# Patient Record
Sex: MALE | Race: WHITE | Employment: OTHER | URBAN - METROPOLITAN AREA
[De-identification: names, ages, dates, MRNs, and addresses within clinical notes are randomized per-mention and may not be internally consistent; named-entity substitution may affect disease eponyms.]

---

## 2017-10-18 PROCEDURE — 84154 ASSAY OF PSA FREE: CPT | Performed by: UROLOGY

## 2017-10-18 PROCEDURE — 36415 COLL VENOUS BLD VENIPUNCTURE: CPT | Performed by: UROLOGY

## 2017-10-18 PROCEDURE — 84153 ASSAY OF PSA TOTAL: CPT | Performed by: UROLOGY

## 2018-04-03 PROCEDURE — 88305 TISSUE EXAM BY PATHOLOGIST: CPT | Performed by: INTERNAL MEDICINE

## 2018-10-16 PROCEDURE — 84154 ASSAY OF PSA FREE: CPT | Performed by: UROLOGY

## 2018-10-16 PROCEDURE — 84153 ASSAY OF PSA TOTAL: CPT | Performed by: UROLOGY

## 2018-10-16 PROCEDURE — 36415 COLL VENOUS BLD VENIPUNCTURE: CPT | Performed by: UROLOGY

## 2021-03-31 RX ORDER — SODIUM CHLORIDE, SODIUM LACTATE, POTASSIUM CHLORIDE, CALCIUM CHLORIDE 600; 310; 30; 20 MG/100ML; MG/100ML; MG/100ML; MG/100ML
INJECTION, SOLUTION INTRAVENOUS CONTINUOUS
Status: CANCELLED | OUTPATIENT
Start: 2021-03-31

## 2021-03-31 RX ORDER — ACETAMINOPHEN 500 MG
1000 TABLET ORAL ONCE
Status: CANCELLED | OUTPATIENT
Start: 2021-03-31 | End: 2021-03-31

## 2021-04-21 ENCOUNTER — LAB ENCOUNTER (OUTPATIENT)
Dept: LAB | Facility: HOSPITAL | Age: 76
End: 2021-04-21
Attending: UROLOGY
Payer: MEDICARE

## 2021-04-21 DIAGNOSIS — N40.0 ENLARGED PROSTATE: ICD-10-CM

## 2021-04-23 ENCOUNTER — ANESTHESIA (OUTPATIENT)
Dept: SURGERY | Facility: HOSPITAL | Age: 76
End: 2021-04-23
Payer: MEDICARE

## 2021-04-23 ENCOUNTER — ANESTHESIA EVENT (OUTPATIENT)
Dept: SURGERY | Facility: HOSPITAL | Age: 76
End: 2021-04-23
Payer: MEDICARE

## 2021-04-23 ENCOUNTER — HOSPITAL ENCOUNTER (OUTPATIENT)
Facility: HOSPITAL | Age: 76
Discharge: HOME | End: 2021-04-24
Attending: UROLOGY | Admitting: UROLOGY
Payer: MEDICARE

## 2021-04-23 DIAGNOSIS — N40.0 ENLARGED PROSTATE: Primary | ICD-10-CM

## 2021-04-23 DIAGNOSIS — R33.9 INCOMPLETE EMPTYING OF BLADDER: ICD-10-CM

## 2021-04-23 PROCEDURE — 88305 TISSUE EXAM BY PATHOLOGIST: CPT | Performed by: UROLOGY

## 2021-04-23 PROCEDURE — 0V508ZZ DESTRUCTION OF PROSTATE, VIA NATURAL OR ARTIFICIAL OPENING ENDOSCOPIC: ICD-10-PCS | Performed by: UROLOGY

## 2021-04-23 RX ORDER — HYDROCODONE BITARTRATE AND ACETAMINOPHEN 5; 325 MG/1; MG/1
1 TABLET ORAL EVERY 6 HOURS PRN
Status: DISCONTINUED | OUTPATIENT
Start: 2021-04-23 | End: 2021-04-24

## 2021-04-23 RX ORDER — ACETAMINOPHEN 500 MG
1000 TABLET ORAL EVERY 6 HOURS PRN
Status: DISCONTINUED | OUTPATIENT
Start: 2021-04-23 | End: 2021-04-24

## 2021-04-23 RX ORDER — PANTOPRAZOLE SODIUM 20 MG/1
20 TABLET, DELAYED RELEASE ORAL
Status: DISCONTINUED | OUTPATIENT
Start: 2021-04-24 | End: 2021-04-24

## 2021-04-23 RX ORDER — MIDAZOLAM HYDROCHLORIDE 1 MG/ML
1 INJECTION INTRAMUSCULAR; INTRAVENOUS EVERY 5 MIN PRN
Status: DISCONTINUED | OUTPATIENT
Start: 2021-04-23 | End: 2021-04-23 | Stop reason: HOSPADM

## 2021-04-23 RX ORDER — METOCLOPRAMIDE HYDROCHLORIDE 5 MG/ML
10 INJECTION INTRAMUSCULAR; INTRAVENOUS AS NEEDED
Status: DISCONTINUED | OUTPATIENT
Start: 2021-04-23 | End: 2021-04-23 | Stop reason: HOSPADM

## 2021-04-23 RX ORDER — MAGNESIUM HYDROXIDE 1200 MG/15ML
3000 LIQUID ORAL CONTINUOUS
Status: DISCONTINUED | OUTPATIENT
Start: 2021-04-23 | End: 2021-04-23 | Stop reason: HOSPADM

## 2021-04-23 RX ORDER — ASCORBIC ACID 500 MG
500 TABLET ORAL 2 TIMES DAILY
Status: DISCONTINUED | OUTPATIENT
Start: 2021-04-23 | End: 2021-04-24

## 2021-04-23 RX ORDER — ACETAMINOPHEN 500 MG
1000 TABLET ORAL EVERY 6 HOURS PRN
COMMUNITY
End: 2021-09-29

## 2021-04-23 RX ORDER — HYDROMORPHONE HYDROCHLORIDE 1 MG/ML
0.4 INJECTION, SOLUTION INTRAMUSCULAR; INTRAVENOUS; SUBCUTANEOUS EVERY 5 MIN PRN
Status: DISCONTINUED | OUTPATIENT
Start: 2021-04-23 | End: 2021-04-23 | Stop reason: HOSPADM

## 2021-04-23 RX ORDER — DEXAMETHASONE SODIUM PHOSPHATE 4 MG/ML
VIAL (ML) INJECTION AS NEEDED
Status: DISCONTINUED | OUTPATIENT
Start: 2021-04-23 | End: 2021-04-23 | Stop reason: SURG

## 2021-04-23 RX ORDER — NALOXONE HYDROCHLORIDE 0.4 MG/ML
80 INJECTION, SOLUTION INTRAMUSCULAR; INTRAVENOUS; SUBCUTANEOUS AS NEEDED
Status: DISCONTINUED | OUTPATIENT
Start: 2021-04-23 | End: 2021-04-23 | Stop reason: HOSPADM

## 2021-04-23 RX ORDER — CEFAZOLIN SODIUM/WATER 2 G/20 ML
SYRINGE (ML) INTRAVENOUS
Status: DISPENSED
Start: 2021-04-23 | End: 2021-04-23

## 2021-04-23 RX ORDER — MIDAZOLAM HYDROCHLORIDE 1 MG/ML
INJECTION INTRAMUSCULAR; INTRAVENOUS AS NEEDED
Status: DISCONTINUED | OUTPATIENT
Start: 2021-04-23 | End: 2021-04-23 | Stop reason: SURG

## 2021-04-23 RX ORDER — ONDANSETRON 2 MG/ML
4 INJECTION INTRAMUSCULAR; INTRAVENOUS AS NEEDED
Status: DISCONTINUED | OUTPATIENT
Start: 2021-04-23 | End: 2021-04-23 | Stop reason: HOSPADM

## 2021-04-23 RX ORDER — ACETAMINOPHEN 500 MG
1000 TABLET ORAL ONCE
Status: DISCONTINUED | OUTPATIENT
Start: 2021-04-23 | End: 2021-04-23

## 2021-04-23 RX ORDER — MULTIVITAMIN/IRON/FOLIC ACID 18MG-0.4MG
250 TABLET ORAL DAILY
Status: DISCONTINUED | OUTPATIENT
Start: 2021-04-23 | End: 2021-04-24

## 2021-04-23 RX ORDER — CEFAZOLIN SODIUM/WATER 2 G/20 ML
2 SYRINGE (ML) INTRAVENOUS ONCE
Status: COMPLETED | OUTPATIENT
Start: 2021-04-23 | End: 2021-04-23

## 2021-04-23 RX ORDER — SODIUM CHLORIDE, SODIUM LACTATE, POTASSIUM CHLORIDE, CALCIUM CHLORIDE 600; 310; 30; 20 MG/100ML; MG/100ML; MG/100ML; MG/100ML
INJECTION, SOLUTION INTRAVENOUS CONTINUOUS
Status: DISCONTINUED | OUTPATIENT
Start: 2021-04-23 | End: 2021-04-23 | Stop reason: HOSPADM

## 2021-04-23 RX ORDER — SODIUM CHLORIDE, SODIUM LACTATE, POTASSIUM CHLORIDE, CALCIUM CHLORIDE 600; 310; 30; 20 MG/100ML; MG/100ML; MG/100ML; MG/100ML
INJECTION, SOLUTION INTRAVENOUS CONTINUOUS
Status: DISCONTINUED | OUTPATIENT
Start: 2021-04-23 | End: 2021-04-24

## 2021-04-23 RX ORDER — LABETALOL HYDROCHLORIDE 5 MG/ML
5 INJECTION, SOLUTION INTRAVENOUS EVERY 5 MIN PRN
Status: DISCONTINUED | OUTPATIENT
Start: 2021-04-23 | End: 2021-04-23 | Stop reason: HOSPADM

## 2021-04-23 RX ORDER — HYDROCODONE BITARTRATE AND ACETAMINOPHEN 5; 325 MG/1; MG/1
2 TABLET ORAL AS NEEDED
Status: DISCONTINUED | OUTPATIENT
Start: 2021-04-23 | End: 2021-04-23 | Stop reason: HOSPADM

## 2021-04-23 RX ORDER — TAMSULOSIN HYDROCHLORIDE 0.4 MG/1
0.4 CAPSULE ORAL DAILY
Status: DISCONTINUED | OUTPATIENT
Start: 2021-04-23 | End: 2021-04-24

## 2021-04-23 RX ORDER — HYDROCODONE BITARTRATE AND ACETAMINOPHEN 5; 325 MG/1; MG/1
1 TABLET ORAL AS NEEDED
Status: DISCONTINUED | OUTPATIENT
Start: 2021-04-23 | End: 2021-04-23 | Stop reason: HOSPADM

## 2021-04-23 RX ORDER — ONDANSETRON 2 MG/ML
INJECTION INTRAMUSCULAR; INTRAVENOUS AS NEEDED
Status: DISCONTINUED | OUTPATIENT
Start: 2021-04-23 | End: 2021-04-23 | Stop reason: SURG

## 2021-04-23 RX ORDER — LIDOCAINE HYDROCHLORIDE 10 MG/ML
INJECTION, SOLUTION EPIDURAL; INFILTRATION; INTRACAUDAL; PERINEURAL AS NEEDED
Status: DISCONTINUED | OUTPATIENT
Start: 2021-04-23 | End: 2021-04-23 | Stop reason: SURG

## 2021-04-23 RX ORDER — FINASTERIDE 5 MG/1
5 TABLET, FILM COATED ORAL DAILY
Status: DISCONTINUED | OUTPATIENT
Start: 2021-04-23 | End: 2021-04-24

## 2021-04-23 RX ADMIN — SODIUM CHLORIDE, SODIUM LACTATE, POTASSIUM CHLORIDE, CALCIUM CHLORIDE: 600; 310; 30; 20 INJECTION, SOLUTION INTRAVENOUS at 12:27:00

## 2021-04-23 RX ADMIN — CEFAZOLIN SODIUM/WATER 2 G: 2 G/20 ML SYRINGE (ML) INTRAVENOUS at 10:24:00

## 2021-04-23 RX ADMIN — ONDANSETRON 4 MG: 2 INJECTION INTRAMUSCULAR; INTRAVENOUS at 11:07:00

## 2021-04-23 RX ADMIN — MIDAZOLAM HYDROCHLORIDE 2 MG: 1 INJECTION INTRAMUSCULAR; INTRAVENOUS at 10:16:00

## 2021-04-23 RX ADMIN — SODIUM CHLORIDE, SODIUM LACTATE, POTASSIUM CHLORIDE, CALCIUM CHLORIDE: 600; 310; 30; 20 INJECTION, SOLUTION INTRAVENOUS at 10:16:00

## 2021-04-23 RX ADMIN — LIDOCAINE HYDROCHLORIDE 30 MG: 10 INJECTION, SOLUTION EPIDURAL; INFILTRATION; INTRACAUDAL; PERINEURAL at 10:21:00

## 2021-04-23 RX ADMIN — DEXAMETHASONE SODIUM PHOSPHATE 8 MG: 4 MG/ML VIAL (ML) INJECTION at 10:30:00

## 2021-04-23 NOTE — ANESTHESIA POSTPROCEDURE EVALUATION
[address] Patient Status:  Hospital Outpatient Surgery   Age/Gender 67year old male MRN [id number]   [facility] SURGERY Attending Antonieta Fleming MD   Hosp Day # 0 PCP Kavitha Wolf MD       Anesthesia Post-op Note

## 2021-04-23 NOTE — ANESTHESIA PREPROCEDURE EVALUATION
PRE-OP EVALUATION    Patient Name: Chapman Ormond McCoy    Admit Diagnosis: Enlarged prostate [N40.0]  Incomplete emptying of bladder [R33.9]    Pre-op Diagnosis: Enlarged prostate [N40.0]  Incomplete emptying of bladder [R33.9]    PHOTOSELECTIVE VAPORIZATIO Endo/Other                                  Pulmonary                           Neuro/Psych                              Patient Active Problem List:     Dysphagia     Neoplasm     Hematuria       Urinary tract infection  Esophageal stricture  Hiatal herni ROM: full Cardiovascular      Rhythm: regular  Rate: normal     Dental    No notable dental history. Pulmonary      Breath sounds clear to auscultation bilaterally.                Other findings            ASA: 2   Plan: general  NPO status verified

## 2021-04-23 NOTE — BRIEF OP NOTE
Pre-Operative Diagnosis: Enlarged prostate [N40.0]  Incomplete emptying of bladder [R33.9]     Post-Operative Diagnosis: Enlarged prostate [N40. 0]Incomplete emptying of bladder [R33.9] urethral stricture     Procedure Performed:   PHOTOSELECTIVE VAPORIZATI

## 2021-04-23 NOTE — ANESTHESIA PROCEDURE NOTES
Airway  Date/Time: 4/23/2021 10:22 AM  Urgency: elective    Airway not difficult    General Information and Staff    Patient location during procedure: OR  Anesthesiologist: Janiya Stallworth MD  Performed: anesthesiologist     Indications and Patient Condi

## 2021-04-23 NOTE — CONSULTS
[facility] Hospitalist Initial Consult       Reason for Consult: Medical Management sp TURP      History of Present Illness: Patient is a 67year old male with PMH sig for BPH who presents sp the above procedure.  He tolerated the procedure well without any immedi Pack years: 0        Quit date: 2005        Years since quittin.3      Smokeless tobacco: Never Used      Tobacco comment: PIPE, QUIT , no cigarrettes    Alcohol use: Yes      Comment: wine x1/every other day       Family Hx:  Family History Bruno Bautista MD  [facility] Hospitalist  Pager: [phone]

## 2021-04-23 NOTE — H&P
Jose Pillai MD  Family Medicine  Enlarged prostate +2 more  Dx  Pre-Op Exam   Reason for Visit          Reason for Visit    Pre-Op Exam Prostate, [facility], 4/23 Dr Alex Otero Notes  Jose Pillai MD (Physician) • • Family Medicine  Expand Socioeconomic History      Marital status:       Spouse name: Not on file      Number of children: Not on file      Years of education: Not on file      Highest education level: Not on file    Occupational History      Not on file    Tobacco Use (20 mg total) by mouth daily. 90 capsule 3   • finasteride 5 MG Oral Tab Take 1 tablet (5 mg total) by mouth daily.  90 tablet 3   • Magnesium 200 MG Oral Tab Take  by mouth daily.       • VITAMIN C 500 MG OR TABS 1 TABLET TWICE DAILY       • METAMUCIL OR 1 no G/R/R   LYMPHATIC: no lymphadenopathy  MUSCULOSKELETAL: no acute synovitis upper or lower extremity  EXTREMITIES: no cyanosis, clubbing or edema, peripheral pulses intact  NEUROLOGIC: intact; no sensorimotor deficit; reflexes normal  PSYCHIATRIC: alert

## 2021-04-24 VITALS
TEMPERATURE: 98 F | SYSTOLIC BLOOD PRESSURE: 152 MMHG | HEIGHT: 70 IN | OXYGEN SATURATION: 95 % | BODY MASS INDEX: 22.13 KG/M2 | HEART RATE: 64 BPM | RESPIRATION RATE: 18 BRPM | WEIGHT: 154.56 LBS | DIASTOLIC BLOOD PRESSURE: 74 MMHG

## 2021-04-24 PROBLEM — N13.8 BPH WITH OBSTRUCTION/LOWER URINARY TRACT SYMPTOMS: Status: ACTIVE | Noted: 2021-04-24

## 2021-04-24 PROBLEM — N40.1 BPH WITH OBSTRUCTION/LOWER URINARY TRACT SYMPTOMS: Status: ACTIVE | Noted: 2021-04-24

## 2021-04-24 NOTE — PLAN OF CARE
Problem: Patient/Family Goals  Goal: Patient/Family Long Term Goal  Description: Patient's Long Term Goal: GO HOME  Interventions:  - IV FLUIDS  -CBI  -VS Q 4 HRS  - See additional Care Plan goals for specific interventions  Outcome: Not Leo Sung INTERVENTIONS:  - Encourage pt to monitor pain and request assistance  - Assess pain using appropriate pain scale  - Administer analgesics based on type and severity of pain and evaluate response  - Implement non-pharmacological measures as appropriate and

## 2021-04-24 NOTE — PLAN OF CARE
Pt a&ox4, VSS, afebrile. Pt denies pain, denies nausea. CBI running at slow rate via 3-way sarabia, light pink urine draining. No clots noted. Safety precautions in place, plan of care discussed with patient, verbalizes understanding.       Problem: Patient/F Consider collaborating with pharmacy to review patient's medication profile  - Implement strategies to promote bladder emptying  Outcome: Progressing     Problem: PAIN - ADULT  Goal: Verbalizes/displays adequate comfort level or patient's stated pain goal

## 2021-04-24 NOTE — DISCHARGE SUMMARY
[facility]    Discharge Summary    Buck Gowers McCoy Patient Status:  Outpatient in a Bed    1945 MRN [id number]   [facility] 3NW-A Attending Michelle Vivas MD   Hosp Day # 0 PCP Jo Norman MD     Date of Admission:  none    Discharge Condition: Stable         Discharge Medications:      Discharge Medications      CONTINUE taking these medications      Instructions Prescription details   acetaminophen 500 MG Tabs  Commonly known as: TYLENOL EXTRA STRENGTH      Take 1,0

## 2021-04-24 NOTE — PLAN OF CARE
NURSING DISCHARGE NOTE    Discharged Home via Wheelchair. Accompanied by Spouse and Support staff  Belongings Taken by patient/family. PT DC VIA WHEELCHAIR IN STABLE CONDITION.  REMOVED IV. EDUCATED PT ON NEGRO CARE AND SIGNS AND SYMPTOMS OF AN INFE Monitor intake/output and perform bladder scan as needed  - Follow urinary retention protocol/standard of care  - Consider collaborating with pharmacy to review patient's medication profile  - Implement strategies to promote bladder emptying  Outcome: Prog

## 2021-04-24 NOTE — PROGRESS NOTES
[address] [facility] Note                                                                   [facility]  7/4/1945    SUBJECTIVE: No chest pain, palpitations, shortness of breath

## 2021-04-24 NOTE — OPERATIVE REPORT
[facility]    PATIENT'S NAME: Shasta Crabtree   ATTENDING PHYSICIAN: Rupali Santana M.D. OPERATING PHYSICIAN: Rupali Santana M.D.    PATIENT ACCOUNT#:   [de-identified]    LOCATION:  [address] [place] [address]  MEDICAL RECORD #:   [id number]       DATE OF BIRTH:  [birth year] Next, GreenLight XPS laser was used to vaporize the lateral lobe of the prostate. There was persistent bleeding. Attempts were made to try to control this without success.   As a result, I converted the procedure to the bipolar transurethral resection of 18:45:53  [facility] [id number]  /

## 2021-06-02 ENCOUNTER — APPOINTMENT (OUTPATIENT)
Dept: LAB | Age: 76
End: 2021-06-02

## 2021-06-02 DIAGNOSIS — N40.1 BPH WITH OBSTRUCTION/LOWER URINARY TRACT SYMPTOMS: Primary | ICD-10-CM

## 2021-06-02 DIAGNOSIS — N13.8 BPH WITH OBSTRUCTION/LOWER URINARY TRACT SYMPTOMS: Primary | ICD-10-CM

## 2021-06-02 LAB
APPEARANCE UR: ABNORMAL
BACTERIA #/AREA URNS HPF: ABNORMAL /HPF
BILIRUB UR QL STRIP: NEGATIVE
COLOR UR: YELLOW
GLUCOSE UR STRIP-MCNC: NEGATIVE MG/DL
HGB UR QL STRIP: ABNORMAL
HYALINE CASTS #/AREA URNS LPF: ABNORMAL /LPF
KETONES UR STRIP-MCNC: NEGATIVE MG/DL
LEUKOCYTE ESTERASE UR QL STRIP: ABNORMAL
MUCOUS THREADS URNS QL MICRO: PRESENT
NITRITE UR QL STRIP: NEGATIVE
PH UR STRIP: 6 [PH] (ref 5–7)
PROT UR STRIP-MCNC: 100 MG/DL
RBC #/AREA URNS HPF: ABNORMAL /HPF
SP GR UR STRIP: 1.01 (ref 1–1.03)
SQUAMOUS #/AREA URNS HPF: ABNORMAL /HPF
UROBILINOGEN UR STRIP-MCNC: 0.2 MG/DL
WBC #/AREA URNS HPF: >100 /HPF

## 2021-06-02 PROCEDURE — 81001 URINALYSIS AUTO W/SCOPE: CPT | Performed by: CLINICAL MEDICAL LABORATORY

## 2021-06-02 PROCEDURE — 87186 SC STD MICRODIL/AGAR DIL: CPT | Performed by: CLINICAL MEDICAL LABORATORY

## 2021-06-02 PROCEDURE — 87086 URINE CULTURE/COLONY COUNT: CPT | Performed by: CLINICAL MEDICAL LABORATORY

## 2021-06-02 PROCEDURE — 87077 CULTURE AEROBIC IDENTIFY: CPT | Performed by: CLINICAL MEDICAL LABORATORY

## 2021-06-05 LAB — BACTERIA UR CULT: ABNORMAL

## 2021-06-10 ENCOUNTER — HOSPITAL ENCOUNTER (EMERGENCY)
Facility: HOSPITAL | Age: 76
Discharge: LEFT BEFORE DISCHARGE | End: 2021-06-10
Payer: MEDICARE

## 2021-06-13 ENCOUNTER — HOSPITAL ENCOUNTER (OUTPATIENT)
Facility: HOSPITAL | Age: 76
Setting detail: OBSERVATION
Discharge: HOME | DRG: 713 | End: 2021-06-14
Attending: EMERGENCY MEDICINE | Admitting: PEDIATRICS
Payer: MEDICARE

## 2021-06-13 ENCOUNTER — ANESTHESIA EVENT (OUTPATIENT)
Dept: SURGERY | Facility: HOSPITAL | Age: 76
DRG: 713 | End: 2021-06-13
Payer: MEDICARE

## 2021-06-13 ENCOUNTER — ANESTHESIA (OUTPATIENT)
Dept: SURGERY | Facility: HOSPITAL | Age: 76
DRG: 713 | End: 2021-06-13
Payer: MEDICARE

## 2021-06-13 DIAGNOSIS — R33.9 URINE RETENTION: Primary | ICD-10-CM

## 2021-06-13 PROCEDURE — 99285 EMERGENCY DEPT VISIT HI MDM: CPT

## 2021-06-13 PROCEDURE — 81001 URINALYSIS AUTO W/SCOPE: CPT | Performed by: EMERGENCY MEDICINE

## 2021-06-13 PROCEDURE — 0V508ZZ DESTRUCTION OF PROSTATE, VIA NATURAL OR ARTIFICIAL OPENING ENDOSCOPIC: ICD-10-PCS | Performed by: UROLOGY

## 2021-06-13 PROCEDURE — 87086 URINE CULTURE/COLONY COUNT: CPT | Performed by: EMERGENCY MEDICINE

## 2021-06-13 PROCEDURE — 80048 BASIC METABOLIC PNL TOTAL CA: CPT | Performed by: EMERGENCY MEDICINE

## 2021-06-13 PROCEDURE — 36415 COLL VENOUS BLD VENIPUNCTURE: CPT

## 2021-06-13 PROCEDURE — 0T7D8ZZ DILATION OF URETHRA, VIA NATURAL OR ARTIFICIAL OPENING ENDOSCOPIC: ICD-10-PCS | Performed by: UROLOGY

## 2021-06-13 PROCEDURE — 93010 ELECTROCARDIOGRAM REPORT: CPT

## 2021-06-13 PROCEDURE — 93005 ELECTROCARDIOGRAM TRACING: CPT

## 2021-06-13 PROCEDURE — 85025 COMPLETE CBC W/AUTO DIFF WBC: CPT | Performed by: EMERGENCY MEDICINE

## 2021-06-13 PROCEDURE — 0TCB8ZZ EXTIRPATION OF MATTER FROM BLADDER, VIA NATURAL OR ARTIFICIAL OPENING ENDOSCOPIC: ICD-10-PCS | Performed by: UROLOGY

## 2021-06-13 RX ORDER — PHENAZOPYRIDINE HYDROCHLORIDE 200 MG/1
200 TABLET, FILM COATED ORAL 3 TIMES DAILY PRN
Status: DISCONTINUED | OUTPATIENT
Start: 2021-06-13 | End: 2021-06-14

## 2021-06-13 RX ORDER — NALOXONE HYDROCHLORIDE 0.4 MG/ML
80 INJECTION, SOLUTION INTRAMUSCULAR; INTRAVENOUS; SUBCUTANEOUS AS NEEDED
Status: DISCONTINUED | OUTPATIENT
Start: 2021-06-13 | End: 2021-06-13 | Stop reason: HOSPADM

## 2021-06-13 RX ORDER — PANTOPRAZOLE SODIUM 20 MG/1
20 TABLET, DELAYED RELEASE ORAL
Status: DISCONTINUED | OUTPATIENT
Start: 2021-06-14 | End: 2021-06-14

## 2021-06-13 RX ORDER — HYDROMORPHONE HYDROCHLORIDE 1 MG/ML
0.4 INJECTION, SOLUTION INTRAMUSCULAR; INTRAVENOUS; SUBCUTANEOUS EVERY 5 MIN PRN
Status: DISCONTINUED | OUTPATIENT
Start: 2021-06-13 | End: 2021-06-13 | Stop reason: HOSPADM

## 2021-06-13 RX ORDER — AMOXICILLIN 500 MG/1
500 CAPSULE ORAL 3 TIMES DAILY
Status: DISCONTINUED | OUTPATIENT
Start: 2021-06-13 | End: 2021-06-14

## 2021-06-13 RX ORDER — FINASTERIDE 5 MG/1
5 TABLET, FILM COATED ORAL DAILY
Status: DISCONTINUED | OUTPATIENT
Start: 2021-06-13 | End: 2021-06-14

## 2021-06-13 RX ORDER — SODIUM CHLORIDE 9 MG/ML
INJECTION, SOLUTION INTRAVENOUS CONTINUOUS
Status: DISCONTINUED | OUTPATIENT
Start: 2021-06-13 | End: 2021-06-14

## 2021-06-13 RX ORDER — SODIUM CHLORIDE 9 MG/ML
INJECTION, SOLUTION INTRAVENOUS CONTINUOUS
Status: DISCONTINUED | OUTPATIENT
Start: 2021-06-13 | End: 2021-06-13 | Stop reason: HOSPADM

## 2021-06-13 RX ORDER — IBUPROFEN 200 MG
CAPSULE ORAL 2 TIMES DAILY
Status: DISCONTINUED | OUTPATIENT
Start: 2021-06-13 | End: 2021-06-14

## 2021-06-13 RX ORDER — LIDOCAINE HYDROCHLORIDE 20 MG/ML
10 JELLY TOPICAL ONCE
Status: COMPLETED | OUTPATIENT
Start: 2021-06-13 | End: 2021-06-13

## 2021-06-13 RX ORDER — LIDOCAINE HYDROCHLORIDE 10 MG/ML
INJECTION, SOLUTION EPIDURAL; INFILTRATION; INTRACAUDAL; PERINEURAL AS NEEDED
Status: DISCONTINUED | OUTPATIENT
Start: 2021-06-13 | End: 2021-06-13 | Stop reason: SURG

## 2021-06-13 RX ORDER — ONDANSETRON 2 MG/ML
4 INJECTION INTRAMUSCULAR; INTRAVENOUS AS NEEDED
Status: DISCONTINUED | OUTPATIENT
Start: 2021-06-13 | End: 2021-06-13 | Stop reason: HOSPADM

## 2021-06-13 RX ORDER — DEXAMETHASONE SODIUM PHOSPHATE 4 MG/ML
VIAL (ML) INJECTION AS NEEDED
Status: DISCONTINUED | OUTPATIENT
Start: 2021-06-13 | End: 2021-06-13 | Stop reason: SURG

## 2021-06-13 RX ORDER — HYDROCODONE BITARTRATE AND ACETAMINOPHEN 5; 325 MG/1; MG/1
1 TABLET ORAL EVERY 6 HOURS PRN
Status: DISCONTINUED | OUTPATIENT
Start: 2021-06-13 | End: 2021-06-14

## 2021-06-13 RX ORDER — MULTIVITAMIN/IRON/FOLIC ACID 18MG-0.4MG
250 TABLET ORAL DAILY
Status: DISCONTINUED | OUTPATIENT
Start: 2021-06-14 | End: 2021-06-14

## 2021-06-13 RX ORDER — CEFAZOLIN SODIUM 1 G/3ML
INJECTION, POWDER, FOR SOLUTION INTRAMUSCULAR; INTRAVENOUS AS NEEDED
Status: DISCONTINUED | OUTPATIENT
Start: 2021-06-13 | End: 2021-06-13 | Stop reason: SURG

## 2021-06-13 RX ORDER — SODIUM CHLORIDE 9 MG/ML
INJECTION, SOLUTION INTRAVENOUS CONTINUOUS PRN
Status: DISCONTINUED | OUTPATIENT
Start: 2021-06-13 | End: 2021-06-13 | Stop reason: SURG

## 2021-06-13 RX ORDER — LIDOCAINE HYDROCHLORIDE 20 MG/ML
JELLY TOPICAL AS NEEDED
Status: DISCONTINUED | OUTPATIENT
Start: 2021-06-13 | End: 2021-06-13

## 2021-06-13 RX ORDER — SODIUM CHLORIDE, SODIUM LACTATE, POTASSIUM CHLORIDE, CALCIUM CHLORIDE 600; 310; 30; 20 MG/100ML; MG/100ML; MG/100ML; MG/100ML
INJECTION, SOLUTION INTRAVENOUS CONTINUOUS
Status: DISCONTINUED | OUTPATIENT
Start: 2021-06-13 | End: 2021-06-13

## 2021-06-13 RX ORDER — POLYETHYLENE GLYCOL 3350 17 G/17G
17 POWDER, FOR SOLUTION ORAL DAILY
COMMUNITY
End: 2021-09-29

## 2021-06-13 RX ORDER — ONDANSETRON 2 MG/ML
INJECTION INTRAMUSCULAR; INTRAVENOUS AS NEEDED
Status: DISCONTINUED | OUTPATIENT
Start: 2021-06-13 | End: 2021-06-13 | Stop reason: SURG

## 2021-06-13 RX ORDER — ACETAMINOPHEN 500 MG
500 TABLET ORAL EVERY 4 HOURS PRN
Status: DISCONTINUED | OUTPATIENT
Start: 2021-06-13 | End: 2021-06-14

## 2021-06-13 RX ADMIN — LIDOCAINE HYDROCHLORIDE 25 MG: 10 INJECTION, SOLUTION EPIDURAL; INFILTRATION; INTRACAUDAL; PERINEURAL at 13:11:00

## 2021-06-13 RX ADMIN — ONDANSETRON 4 MG: 2 INJECTION INTRAMUSCULAR; INTRAVENOUS at 13:32:00

## 2021-06-13 RX ADMIN — SODIUM CHLORIDE: 9 INJECTION, SOLUTION INTRAVENOUS at 13:05:00

## 2021-06-13 RX ADMIN — CEFAZOLIN SODIUM 2 G: 1 INJECTION, POWDER, FOR SOLUTION INTRAMUSCULAR; INTRAVENOUS at 13:17:00

## 2021-06-13 RX ADMIN — DEXAMETHASONE SODIUM PHOSPHATE 4 MG: 4 MG/ML VIAL (ML) INJECTION at 13:25:00

## 2021-06-13 NOTE — ANESTHESIA POSTPROCEDURE EVALUATION
[address] Patient Status:  Emergency   Age/Gender 67year old male MRN [id number]   [facility] SURGERY Attending Ana Gonzalez, [address] [facility] Day # 0 PCP Earl Durham MD       Anesthesia Post-op Note    CYSTOSCOPY

## 2021-06-13 NOTE — CONSULTS
[facility] Urology Consultation    Manjit Rosales Hernandez Patient Status:  Emergency    1945 MRN [id number]   Location [address] [facility] Attending [facility], [address] [facility] Day # 0 PCP Marques Thao MD     Reason for [facility] Cystoscopy, PVR, Prostate Biopsy - dr. Tello Ness    • OTHER SURGICAL HISTORY  04/23/2021    TURP w/ UD - Dr. Tello Ness   • SPECIAL SERVICE OR REPORT      DENIES     Family History   Problem Relation Age of Onset   • Hypertension Father    • Heart Disorder Father hematuria with urinary retention  -Suspect secondary to clots and recurrent urethral stricture disease  -s/p cysto, UD, PVP/TURP 4/23/21 with Dr Bessie Almonte with steadily decreasing stream and issues with UTI since (on amox since 6/7/21)  -Multiple attempts to p

## 2021-06-13 NOTE — ED PROVIDER NOTES
Patient Seen in: [facility] Emergency Department      History   Patient presents with:  Urinary Retention    Stated Complaint: hasnt urinated since 3am    HPI/Subjective:   HPI    25-year-old male presents with urinary retention.   States the last ti 04/23/2021    KISHORE carrera/ RUBIN - Dr. Kacey Palma   • [facility] REPORT      DENIES                Social History    Tobacco Use      Smoking status: Former Smoker        Packs/day: 0.00        Years: 30.00        Pack years: 0        Quit date: 1/1/2005 following orders were created for panel order CBC With Differential With Platelet.   Procedure                               Abnormality         Status                     ---------                               -----------         ------

## 2021-06-13 NOTE — ED INITIAL ASSESSMENT (HPI)
PATIENT PRESENTS WITH C/O URINARY RETENTION. PATIENT PRESENTED TO ED THREE DAYS AGO FOR SAME COMPLAINT BUT WAS ABLE TO VOID PRIOR TO BEING SEEN. FOLLOWS WITH DR Gayle Moreno FROM UROLOGY.

## 2021-06-13 NOTE — ED QUICK NOTES
Attempted 12f for relief post uro-jet, was able to visualize stricture - opening past meatus approx1-2mm, ER MD informed.

## 2021-06-13 NOTE — ED QUICK NOTES
Post urojet pt began to void with very slow but steady stream, Pt reports that it may take him awhile to void. Pt will hit call light when ready.  PT NPO and aware

## 2021-06-13 NOTE — BRIEF OP NOTE
Pre-Operative Diagnosis: Hematuria [R31.9], urinary retention     Post-Operative Diagnosis: Hematuria [R31.9], urinary retention, urethral meatal stenosis     Procedure Performed:   Cystoscopy, urethral dilation, prostate fulguration, clot evacuation, comp

## 2021-06-13 NOTE — ANESTHESIA PREPROCEDURE EVALUATION
PRE-OP EVALUATION    Patient Name: Jarvis Hernandez    Admit Diagnosis: Urine retention [R33.9]    Pre-op Diagnosis: Hematuria [R31.9]    CYSTOSCOPY - CLOT EVACUATION  POSS.   FULGURATION POSS CLOT EVACUATION    Anesthesia Procedure: CYSTOSCOPY - CLOT TERA Neuro/Psych                              UTI  Hiatal hernia        Past Surgical History:   Procedure Laterality Date   • COLONOSCOPY  2010    Screening: per pt, was normal   • COLONOSCOPY N/A 4/3/2018    Procedure: COLONOSCOPY, POSSIBLE BIOPSY, POSSIBLE P 06/13/2021    CREATSERUM 1.06 04/12/2021    GLU 98 06/13/2021     04/12/2021    CA 9.5 06/13/2021    CA 9.6 04/12/2021            Airway      Mallampati: II  Mouth opening: >3 FB  TM distance: 4 - 6 cm  Neck ROM: full Cardiovascular      Rhythm: reg

## 2021-06-13 NOTE — PLAN OF CARE
NURSING ADMISSION NOTE      Patient admitted via Cart  Oriented to room. Safety precautions initiated. Bed in low position. Call light in reach. Reviewed home meds; prefers to takes medications closer to midnight.      Problem: Patient/Family Goal

## 2021-06-14 VITALS
TEMPERATURE: 98 F | BODY MASS INDEX: 22 KG/M2 | SYSTOLIC BLOOD PRESSURE: 158 MMHG | HEART RATE: 57 BPM | OXYGEN SATURATION: 97 % | RESPIRATION RATE: 18 BRPM | WEIGHT: 150 LBS | DIASTOLIC BLOOD PRESSURE: 74 MMHG

## 2021-06-14 NOTE — DISCHARGE SUMMARY
[facility]    Discharge Summary    Zana Hernandez Patient Status:  Inpatient    1945 MRN [id number]   [facility] 3NW-A Attending Daniel Daniel DO   Hosp Day # 1 PCP Erika James MD     Date of Admission: 2021 Disp Vieira placement. Complications: none    Discharge Condition: Good       **Certification    Admission date was 6/13/2021. Inpatient stay was shorter than expected.   Patient's Urine retention was initially serious enough to expect a more lengthy hospital

## 2021-06-14 NOTE — OPERATIVE REPORT
[facility]    PATIENT'S NAME: Ubaldo Gitelman [place]   ATTENDING PHYSICIAN: Delfin Freed DO   OPERATING PHYSICIAN: Delfin Freed DO   PATIENT ACCOUNT#:   [de-identified]    LOCATION:  [address] [place] [address] #:   [id number]       DATE After the appropriate informed consent was obtained and in the chart, the patient was given preoperative antibiotic, taken to the cystoscopy suite and placed on the cystoscopy table in supine position.   After bilateral sequential compression devices had be postanesthesia care unit in stable condition with no immediately apparent complications. He will be admitted to the hospital for observation today and will likely discharge home tomorrow. We will leave the catheter for 3 or 4 days.      Dictated By [facility] [employer]

## 2021-06-14 NOTE — PROGRESS NOTES
[facility]    Progress Note    Sudeep Distance Patient Status:  Inpatient    1945 MRN [id number]   [facility] 3NW-A Attending Loi Restrepo DO   Hosp Day # 1 PCP Jo Norman MD     HPI/Subjective:   Buck Gowers McCoy is 6/17/2021  3:00 PM Amanda Bergman MD [address] Jaya Roth PA-C  [facility] Urology  6/14/2021

## 2021-06-14 NOTE — PLAN OF CARE
Upon assessment, VSS, afebrile. Pt denies pain. Pt tolerating general diet without nausea. Vieira catheter draining clear/dilute urine, no clots noted. Neosporin applied to tip of penis. Adequate urine output noted. IV saline locked.  Pt informed of POC and intake/output and perform bladder scan as needed  - Follow urinary retention protocol/standard of care  - Consider collaborating with pharmacy to review patient's medication profile  - Implement strategies to promote bladder emptying  Outcome: Completed

## 2021-06-14 NOTE — PLAN OF CARE
NURSING DISCHARGE NOTE    Discharged Home via Wheelchair. Accompanied by Support staff  Belongings Taken by patient/family. Pt and spouse given discharge instructions. Verbalized understanding. No new RX.  New standard drainage sarabia bag applied and

## 2021-06-14 NOTE — PLAN OF CARE
Pt a&ox4, VSS, afebrile. Denies pain. Tolerating diet. IV saline locked. Vieira catheter draining clear yellow urine. Plan of care discussed with pt, verbalizes understanding.        Problem: Patient/Family Goals  Goal: Patient/Family Long Term Goal  Descrip medication profile  - Implement strategies to promote bladder emptying  Outcome: Progressing

## 2021-06-15 NOTE — PAYOR COMM NOTE
--------------  DISCHARGE REVIEW    Payor: Marcie Terry 673 #:  [id number]  Authorization Number: [id number]    Admit date: 6/13/21  Admit time:   2:58 PM  Discharge Date: 6/14/2021  2:05 PM     Admitting Physician: DO Mati Clements and sarabia draining in good amounts.     Consultations: none     Procedures: Cystoscopy, urethral dilation, prostate fulguration, clot evacuation, and complex Sarabia placement.         ADMISSION REVIEW     Payor: Jad Estevez  Subscriber #:  Kathi Diaz • Hydronephrosis    • Retention of urine    • Urinary tract infection     current, on antibiotics, Dr Bishnu Diaz aware per pt, Dr Louann Hatchet monitoring,   • Visual impairment     glasses              Past Surgical History:   Procedure Laterality Date   • Angely Rodriguez Physical Exam    GENERAL: Patient is awake, alert, well-appearing, in no acute distress. HEENT:  no scleral icterus. Mucous membranes are moist, oropharynx is clear, uvula midline. HEART: Regular rate and rhythm, no murmurs.   LUNGS: Clear to a able to urinate small amount. Discussed plan with the patient who is in agreement.                              Disposition and Plan     Clinical Impression:  Urine retention  (primary encounter diagnosis)   Suspected urethral stricture  Disposition:  Send

## 2021-06-16 NOTE — PAYOR COMM NOTE
Discharge Notification    Patient Name: Radha Gupta  Payor: Minh Abbasi #: [id number]  Authorization Number: [id number]  Admit Date/Time: 6/13/2021 8:36 AM  Discharge Date/Time: 6/14/2021 2:05 PM    OBSERVATION

## 2021-09-29 PROBLEM — R33.9 URINE RETENTION: Status: RESOLVED | Noted: 2021-06-13 | Resolved: 2021-09-29

## 2021-11-12 PROBLEM — I10 BENIGN ESSENTIAL HTN: Status: ACTIVE | Noted: 2021-11-12

## 2022-01-05 PROCEDURE — 99284 EMERGENCY DEPT VISIT MOD MDM: CPT

## 2022-01-05 PROCEDURE — 36415 COLL VENOUS BLD VENIPUNCTURE: CPT

## 2022-01-05 PROCEDURE — 93010 ELECTROCARDIOGRAM REPORT: CPT

## 2022-01-06 ENCOUNTER — HOSPITAL ENCOUNTER (EMERGENCY)
Facility: HOSPITAL | Age: 77
Discharge: HOME | End: 2022-01-06
Attending: EMERGENCY MEDICINE
Payer: MEDICARE

## 2022-01-06 VITALS
TEMPERATURE: 98 F | BODY MASS INDEX: 21.47 KG/M2 | WEIGHT: 150 LBS | DIASTOLIC BLOOD PRESSURE: 62 MMHG | OXYGEN SATURATION: 98 % | HEIGHT: 70 IN | SYSTOLIC BLOOD PRESSURE: 128 MMHG | RESPIRATION RATE: 18 BRPM | HEART RATE: 66 BPM

## 2022-01-06 DIAGNOSIS — N30.01 ACUTE CYSTITIS WITH HEMATURIA: ICD-10-CM

## 2022-01-06 DIAGNOSIS — I10 PRIMARY HYPERTENSION: Primary | ICD-10-CM

## 2022-01-06 LAB
ALBUMIN SERPL-MCNC: 3.8 G/DL (ref 3.4–5)
ALBUMIN/GLOB SERPL: 1.1 {RATIO} (ref 1–2)
ALP LIVER SERPL-CCNC: 71 U/L
ALT SERPL-CCNC: 32 U/L
ANION GAP SERPL CALC-SCNC: 6 MMOL/L (ref 0–18)
AST SERPL-CCNC: 28 U/L (ref 15–37)
ATRIAL RATE: 73 BPM
BASOPHILS # BLD AUTO: 0.04 X10(3) UL (ref 0–0.2)
BASOPHILS NFR BLD AUTO: 0.7 %
BILIRUB SERPL-MCNC: 0.7 MG/DL (ref 0.1–2)
BILIRUB UR QL STRIP.AUTO: NEGATIVE
BUN BLD-MCNC: 19 MG/DL (ref 7–18)
CALCIUM BLD-MCNC: 9.4 MG/DL (ref 8.5–10.1)
CHLORIDE SERPL-SCNC: 104 MMOL/L (ref 98–112)
CLARITY UR REFRACT.AUTO: CLEAR
CO2 SERPL-SCNC: 29 MMOL/L (ref 21–32)
COLOR UR AUTO: YELLOW
CREAT BLD-MCNC: 1.23 MG/DL
EOSINOPHIL # BLD AUTO: 0.16 X10(3) UL (ref 0–0.7)
EOSINOPHIL NFR BLD AUTO: 2.7 %
ERYTHROCYTE [DISTWIDTH] IN BLOOD BY AUTOMATED COUNT: 13 %
GLOBULIN PLAS-MCNC: 3.5 G/DL (ref 2.8–4.4)
GLUCOSE BLD-MCNC: 91 MG/DL (ref 70–99)
GLUCOSE UR STRIP.AUTO-MCNC: NEGATIVE MG/DL
HCT VFR BLD AUTO: 44.8 %
HGB BLD-MCNC: 14.7 G/DL
IMM GRANULOCYTES # BLD AUTO: 0.01 X10(3) UL (ref 0–1)
IMM GRANULOCYTES NFR BLD: 0.2 %
LYMPHOCYTES # BLD AUTO: 1.4 X10(3) UL (ref 1–4)
LYMPHOCYTES NFR BLD AUTO: 23.9 %
MCH RBC QN AUTO: 31.3 PG (ref 26–34)
MCHC RBC AUTO-ENTMCNC: 32.8 G/DL (ref 31–37)
MCV RBC AUTO: 95.3 FL
MONOCYTES # BLD AUTO: 0.62 X10(3) UL (ref 0.1–1)
MONOCYTES NFR BLD AUTO: 10.6 %
NEUTROPHILS # BLD AUTO: 3.64 X10 (3) UL (ref 1.5–7.7)
NEUTROPHILS # BLD AUTO: 3.64 X10(3) UL (ref 1.5–7.7)
NEUTROPHILS NFR BLD AUTO: 61.9 %
NITRITE UR QL STRIP.AUTO: NEGATIVE
OSMOLALITY SERPL CALC.SUM OF ELEC: 290 MOSM/KG (ref 275–295)
P AXIS: 65 DEGREES
P-R INTERVAL: 142 MS
PH UR STRIP.AUTO: 7 [PH] (ref 5–8)
PLATELET # BLD AUTO: 209 10(3)UL (ref 150–450)
POTASSIUM SERPL-SCNC: 4.1 MMOL/L (ref 3.5–5.1)
PROT SERPL-MCNC: 7.3 G/DL (ref 6.4–8.2)
PROT UR STRIP.AUTO-MCNC: NEGATIVE MG/DL
Q-T INTERVAL: 412 MS
QRS DURATION: 88 MS
QTC CALCULATION (BEZET): 453 MS
R AXIS: -1 DEGREES
RBC # BLD AUTO: 4.7 X10(6)UL
RBC #/AREA URNS AUTO: >10 /HPF
SODIUM SERPL-SCNC: 139 MMOL/L (ref 136–145)
SP GR UR STRIP.AUTO: 1.01 (ref 1–1.03)
T AXIS: 43 DEGREES
UROBILINOGEN UR STRIP.AUTO-MCNC: <2 MG/DL
VENTRICULAR RATE: 73 BPM
WBC # BLD AUTO: 5.9 X10(3) UL (ref 4–11)

## 2022-01-06 PROCEDURE — 87086 URINE CULTURE/COLONY COUNT: CPT | Performed by: EMERGENCY MEDICINE

## 2022-01-06 PROCEDURE — 93005 ELECTROCARDIOGRAM TRACING: CPT

## 2022-01-06 PROCEDURE — 81001 URINALYSIS AUTO W/SCOPE: CPT | Performed by: EMERGENCY MEDICINE

## 2022-01-06 PROCEDURE — 85025 COMPLETE CBC W/AUTO DIFF WBC: CPT | Performed by: EMERGENCY MEDICINE

## 2022-01-06 PROCEDURE — 80053 COMPREHEN METABOLIC PANEL: CPT | Performed by: EMERGENCY MEDICINE

## 2022-01-06 RX ORDER — HYDROCHLOROTHIAZIDE 25 MG/1
25 TABLET ORAL DAILY PRN
Qty: 20 TABLET | Refills: 0 | Status: SHIPPED | OUTPATIENT
Start: 2022-01-06

## 2022-01-06 RX ORDER — LEVOFLOXACIN 500 MG/1
500 TABLET, FILM COATED ORAL DAILY
Qty: 10 TABLET | Refills: 0 | Status: SHIPPED | OUTPATIENT
Start: 2022-01-06 | End: 2022-01-16

## 2022-01-06 RX ORDER — CLONIDINE HYDROCHLORIDE 0.1 MG/1
0.2 TABLET ORAL ONCE
Status: COMPLETED | OUTPATIENT
Start: 2022-01-06 | End: 2022-01-06

## 2022-01-06 NOTE — ED INITIAL ASSESSMENT (HPI)
Patient took BP at home last bp 192/93 at 1800, 197/100 at 100. Patient denies any headache but states he is feeling anxious.   Patient states he passed a blood clot while urinating this afternoon which hasn't happened since he had his prostate was resecte

## 2022-01-06 NOTE — ED PROVIDER NOTES
Patient Seen in: [facility] Emergency Department      History   Patient presents with:  Hypertension    Stated Complaint: HIGH BLOOD PRESSURE, STATES 200-100    Subjective:   HPI    58-year-old male history of hypertension, UTI presents to ED with r status: Former Smoker        Packs/day: 0.00        Years: 30.00        Pack years: 0        Quit date: 2005        Years since quittin.0      Smokeless tobacco: Never Used      Tobacco comment: Andres  , no cigarrettes    Vaping Use      V Cells Few (*)     All other components within normal limits   CBC WITH DIFFERENTIAL WITH PLATELET    Narrative: The following orders were created for panel order CBC With Differential With Platelet.   Procedure                               Abnormality R-0    levoFLOXacin 500 MG Oral Tab  Take 1 tablet (500 mg total) by mouth daily for 10 days. , Normal, Disp-10 tablet, R-0

## 2022-05-25 ENCOUNTER — WALK IN (OUTPATIENT)
Dept: URGENT CARE | Age: 77
End: 2022-05-25

## 2022-05-25 VITALS
HEIGHT: 70 IN | OXYGEN SATURATION: 97 % | RESPIRATION RATE: 18 BRPM | TEMPERATURE: 97.6 F | WEIGHT: 150 LBS | DIASTOLIC BLOOD PRESSURE: 76 MMHG | BODY MASS INDEX: 21.47 KG/M2 | SYSTOLIC BLOOD PRESSURE: 139 MMHG | HEART RATE: 76 BPM
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
## 2022-05-25 PROCEDURE — 99203 OFFICE O/P NEW LOW 30 MIN: CPT | Performed by: NURSE PRACTITIONER

## 2022-05-25 RX ORDER — HYDROCHLOROTHIAZIDE 25 MG/1
25 TABLET ORAL DAILY
COMMUNITY
Start: 2022-01-06

## 2022-05-25 RX ORDER — OMEPRAZOLE 20 MG/1
20 CAPSULE, DELAYED RELEASE ORAL DAILY
COMMUNITY
Start: 2022-02-08

## 2022-05-25 RX ORDER — DOXYCYCLINE HYCLATE 100 MG/1
200 CAPSULE ORAL ONCE
Qty: 2 CAPSULE | Refills: 0 | Status: SHIPPED | OUTPATIENT
Start: 2022-05-25 | End: 2022-05-26

## 2022-05-25 RX ORDER — LOSARTAN POTASSIUM 100 MG/1
100 TABLET ORAL DAILY
COMMUNITY
Start: 2022-02-27

## 2022-05-25 RX ORDER — FINASTERIDE 5 MG/1
1 TABLET, FILM COATED ORAL DAILY
COMMUNITY
Start: 2022-04-09

## 2022-12-04 ENCOUNTER — HOSPITAL ENCOUNTER (EMERGENCY)
Facility: HOSPITAL | Age: 77
Discharge: HOME | End: 2022-12-04
Attending: EMERGENCY MEDICINE
Payer: MEDICARE

## 2022-12-04 VITALS
HEIGHT: 70 IN | HEART RATE: 80 BPM | DIASTOLIC BLOOD PRESSURE: 68 MMHG | SYSTOLIC BLOOD PRESSURE: 141 MMHG | RESPIRATION RATE: 16 BRPM | WEIGHT: 145 LBS | OXYGEN SATURATION: 98 % | BODY MASS INDEX: 20.76 KG/M2 | TEMPERATURE: 98 F

## 2022-12-04 DIAGNOSIS — K56.41 FECAL IMPACTION (HCC): Primary | ICD-10-CM

## 2022-12-04 PROCEDURE — 99283 EMERGENCY DEPT VISIT LOW MDM: CPT

## 2022-12-04 NOTE — ED QUICK NOTES
Patient discharged in stable condition with wife. Discussed discharge instructions with patient.  Patient verbalized understanding and will follow-up with primary care MD.

## (undated) DEVICE — 60 ML SYRINGE,TOOMEY TYPE: Brand: MONOJECT

## (undated) DEVICE — Device

## (undated) DEVICE — LASER

## (undated) DEVICE — NITINOL WIRE ANGLED STIFF 035

## (undated) DEVICE — ZIPWIRE GUIDEWIRE .038X150 STR

## (undated) DEVICE — SOLUTION SET, MALE LUER LOCK ADAPTER

## (undated) DEVICE — CYSTO CDS-LF: Brand: MEDLINE INDUSTRIES, INC.

## (undated) DEVICE — SOL  .9 3000ML

## (undated) DEVICE — STERILE POLYISOPRENE POWDER-FREE SURGICAL GLOVES: Brand: PROTEXIS

## (undated) DEVICE — SCD SLEEVE KNEE HI BLEND

## (undated) DEVICE — BAG URINE LEG W/VELCRO

## (undated) DEVICE — REM POLYHESIVE ADULT PATIENT RETURN ELECTRODE: Brand: VALLEYLAB

## (undated) DEVICE — FIBER

## (undated) DEVICE — HF-RESECTION ELECTRODE PLASMALOOP LOOP, MEDIUM, 24 FR., 12°/16°, ESG TURIS: Brand: OLYMPUS

## (undated) DEVICE — CAUTERY CORD DISP E0503

## (undated) DEVICE — SYRINGE 10ML LL TIP

## (undated) DEVICE — URINE DRAINAGE BAG,BAG, NEEDLE SAMPLING, DRAIN TUBE: Brand: DOVER

## (undated) DEVICE — SYRINGE 30ML LL TIP

## (undated) NOTE — LETTER
[facility] Testing Department  Phone: [phone]  OUTSIDE TESTING RESULT REQUEST      TO: Dr. Jordan Juan and staff    Today's Date: 3/31/21    FAX #: [phone]     IMPORTANT: FOR YOUR IMMEDIATE ATTENTION  Please FAX all test results listed b

## (undated) NOTE — LETTER
[facility] [address], [address] [place] [address]    Consent for Anesthesia   1.    Selina SYLVESTER agree to be cared for by a physician anesthesiologist alone and/or with a nurse anesthetist, who is specially trained to monitor me and give allergic reactions to medications, injury to my airway, heart, lungs, vision, nerves, or muscles and in extremely rare instances death. 5. My doctor has explained to me other choices available to me for my care (alternatives).   6. Pregnant Patients (“epid Printed: 6/13/2021 at 11:52 AM    Medical Record #: [id number]                                            Page 1 of 1

## (undated) NOTE — LETTER
Erika Henson [address] [facility] [address], [address] [facility]  Authorization for Surgical Operation and Procedure   Date:___________                                                                                            Time:__________  1.  I hereby aut following are some, but not all, of the potential risks that can occur: fever and allergic reactions, hemolytic reactions, transmission of diseases such as Hepatitis, AIDS and Cytomegalovirus (CMV) and fluid overload.   In the event that I wish to have an a The surgeon or my attending physician will determine when the applicable recovery period ends for purposes of reinstating the DNAR order.   10. Patients having a sterilization procedure: I understand that if the procedure is successful the results will be p